# Patient Record
Sex: FEMALE | Race: WHITE | URBAN - METROPOLITAN AREA
[De-identification: names, ages, dates, MRNs, and addresses within clinical notes are randomized per-mention and may not be internally consistent; named-entity substitution may affect disease eponyms.]

---
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
## 2024-11-05 ENCOUNTER — APPOINTMENT (EMERGENCY)
Dept: RADIOLOGY | Facility: HOSPITAL | Age: 19
End: 2024-11-05
Payer: COMMERCIAL

## 2024-11-05 ENCOUNTER — HOSPITAL ENCOUNTER (EMERGENCY)
Facility: HOSPITAL | Age: 19
Discharge: HOME | End: 2024-11-05
Attending: EMERGENCY MEDICINE | Admitting: EMERGENCY MEDICINE
Payer: COMMERCIAL

## 2024-11-05 VITALS
SYSTOLIC BLOOD PRESSURE: 117 MMHG | BODY MASS INDEX: 21.98 KG/M2 | HEART RATE: 100 BPM | WEIGHT: 145 LBS | DIASTOLIC BLOOD PRESSURE: 70 MMHG | OXYGEN SATURATION: 99 % | TEMPERATURE: 99.5 F | RESPIRATION RATE: 17 BRPM | HEIGHT: 68 IN

## 2024-11-05 DIAGNOSIS — N12 PYELONEPHRITIS: Primary | ICD-10-CM

## 2024-11-05 LAB
ALBUMIN SERPL-MCNC: 4.1 G/DL (ref 3.5–5.7)
ALP SERPL-CCNC: 44 IU/L (ref 34–125)
ALT SERPL-CCNC: 11 IU/L (ref 7–52)
ANION GAP SERPL CALC-SCNC: 10 MEQ/L (ref 3–15)
AST SERPL-CCNC: 13 IU/L (ref 13–39)
BACTERIA URNS QL MICRO: 1 /HPF
BASOPHILS # BLD: 0.08 K/UL (ref 0.01–0.1)
BASOPHILS NFR BLD: 0.6 %
BILIRUB SERPL-MCNC: 0.5 MG/DL (ref 0.3–1.2)
BILIRUB UR QL STRIP.AUTO: NEGATIVE MG/DL
BUN SERPL-MCNC: 9 MG/DL (ref 7–25)
CALCIUM SERPL-MCNC: 9 MG/DL (ref 8.6–10.3)
CHLORIDE SERPL-SCNC: 101 MEQ/L (ref 98–107)
CLARITY UR REFRACT.AUTO: CLEAR
CO2 SERPL-SCNC: 23 MEQ/L (ref 21–31)
COLOR UR AUTO: YELLOW
CREAT SERPL-MCNC: 1 MG/DL (ref 0.6–1.2)
DIFFERENTIAL METHOD BLD: ABNORMAL
EGFRCR SERPLBLD CKD-EPI 2021: >60 ML/MIN/1.73M*2
EOSINOPHIL # BLD: 0.01 K/UL (ref 0.04–0.36)
EOSINOPHIL NFR BLD: 0.1 %
ERYTHROCYTE [DISTWIDTH] IN BLOOD BY AUTOMATED COUNT: 12 % (ref 11.7–14.4)
FLUAV RNA SPEC QL NAA+PROBE: NEGATIVE
FLUBV RNA SPEC QL NAA+PROBE: NEGATIVE
GLUCOSE SERPL-MCNC: 106 MG/DL (ref 70–99)
GLUCOSE UR STRIP.AUTO-MCNC: NEGATIVE MG/DL
HCG UR QL: NEGATIVE
HCT VFR BLD AUTO: 38 % (ref 35–45)
HGB BLD-MCNC: 12.9 G/DL (ref 11.8–15.7)
HGB UR QL STRIP.AUTO: NEGATIVE
HYALINE CASTS #/AREA URNS LPF: ABNORMAL /LPF
IMM GRANULOCYTES # BLD AUTO: 0.05 K/UL (ref 0–0.08)
IMM GRANULOCYTES NFR BLD AUTO: 0.4 %
KETONES UR STRIP.AUTO-MCNC: ABNORMAL MG/DL
LEUKOCYTE ESTERASE UR QL STRIP.AUTO: ABNORMAL
LYMPHOCYTES # BLD: 0.67 K/UL (ref 1.2–3.5)
LYMPHOCYTES NFR BLD: 5.4 %
MCH RBC QN AUTO: 29.5 PG (ref 28–33.2)
MCHC RBC AUTO-ENTMCNC: 33.9 G/DL (ref 32.2–35.5)
MCV RBC AUTO: 86.8 FL (ref 83–98)
MONOCYTES # BLD: 1.21 K/UL (ref 0.28–0.8)
MONOCYTES NFR BLD: 9.8 %
MUCOUS THREADS URNS QL MICRO: 3 /LPF
NEUTROPHILS # BLD: 10.31 K/UL (ref 1.7–7)
NEUTS SEG NFR BLD: 83.7 %
NITRITE UR QL STRIP.AUTO: NEGATIVE
NRBC BLD-RTO: 0 %
PH UR STRIP.AUTO: 6 [PH]
PLATELET # BLD AUTO: 240 K/UL (ref 150–369)
PMV BLD AUTO: 11.4 FL (ref 9.4–12.3)
POTASSIUM SERPL-SCNC: 3.4 MEQ/L (ref 3.5–5.1)
PROT SERPL-MCNC: 7.1 G/DL (ref 6–8.2)
PROT UR QL STRIP.AUTO: 1
RBC # BLD AUTO: 4.38 M/UL (ref 3.93–5.22)
RBC #/AREA URNS HPF: ABNORMAL /HPF
RSV RNA SPEC QL NAA+PROBE: NEGATIVE
SARS-COV-2 RNA RESP QL NAA+PROBE: NEGATIVE
SODIUM SERPL-SCNC: 134 MEQ/L (ref 136–145)
SP GR UR REFRACT.AUTO: 1.02
SQUAMOUS URNS QL MICRO: ABNORMAL /HPF
UROBILINOGEN UR STRIP-ACNC: 0.2 EU/DL
WBC # BLD AUTO: 12.33 K/UL (ref 3.8–10.5)
WBC #/AREA URNS HPF: ABNORMAL /HPF

## 2024-11-05 PROCEDURE — 63600000 HC DRUGS/DETAIL CODE: Mod: JZ | Performed by: PHYSICIAN ASSISTANT

## 2024-11-05 PROCEDURE — 85025 COMPLETE CBC W/AUTO DIFF WBC: CPT

## 2024-11-05 PROCEDURE — 87086 URINE CULTURE/COLONY COUNT: CPT | Performed by: EMERGENCY MEDICINE

## 2024-11-05 PROCEDURE — 25800000 HC PHARMACY IV SOLUTIONS: Performed by: PHYSICIAN ASSISTANT

## 2024-11-05 PROCEDURE — 3E033GC INTRODUCTION OF OTHER THERAPEUTIC SUBSTANCE INTO PERIPHERAL VEIN, PERCUTANEOUS APPROACH: ICD-10-PCS | Performed by: EMERGENCY MEDICINE

## 2024-11-05 PROCEDURE — 81003 URINALYSIS AUTO W/O SCOPE: CPT

## 2024-11-05 PROCEDURE — 87637 SARSCOV2&INF A&B&RSV AMP PRB: CPT | Performed by: PHYSICIAN ASSISTANT

## 2024-11-05 PROCEDURE — 63700000 HC SELF-ADMINISTRABLE DRUG: Performed by: PHYSICIAN ASSISTANT

## 2024-11-05 PROCEDURE — 36415 COLL VENOUS BLD VENIPUNCTURE: CPT

## 2024-11-05 PROCEDURE — 84703 CHORIONIC GONADOTROPIN ASSAY: CPT | Performed by: EMERGENCY MEDICINE

## 2024-11-05 PROCEDURE — 80053 COMPREHEN METABOLIC PANEL: CPT | Performed by: EMERGENCY MEDICINE

## 2024-11-05 PROCEDURE — 81001 URINALYSIS AUTO W/SCOPE: CPT | Performed by: EMERGENCY MEDICINE

## 2024-11-05 PROCEDURE — 85025 COMPLETE CBC W/AUTO DIFF WBC: CPT | Performed by: EMERGENCY MEDICINE

## 2024-11-05 PROCEDURE — 71046 X-RAY EXAM CHEST 2 VIEWS: CPT

## 2024-11-05 PROCEDURE — 99284 EMERGENCY DEPT VISIT MOD MDM: CPT | Mod: 25

## 2024-11-05 PROCEDURE — 82040 ASSAY OF SERUM ALBUMIN: CPT

## 2024-11-05 PROCEDURE — 96375 TX/PRO/DX INJ NEW DRUG ADDON: CPT

## 2024-11-05 PROCEDURE — 3E03329 INTRODUCTION OF OTHER ANTI-INFECTIVE INTO PERIPHERAL VEIN, PERCUTANEOUS APPROACH: ICD-10-PCS | Performed by: EMERGENCY MEDICINE

## 2024-11-05 PROCEDURE — 96365 THER/PROPH/DIAG IV INF INIT: CPT

## 2024-11-05 RX ORDER — EPINEPHRINE 0.3 MG/.3ML
0.3 INJECTION SUBCUTANEOUS
COMMUNITY

## 2024-11-05 RX ORDER — ONDANSETRON 4 MG/1
4 TABLET, ORALLY DISINTEGRATING ORAL EVERY 8 HOURS PRN
Qty: 10 TABLET | Refills: 0 | Status: SHIPPED | OUTPATIENT
Start: 2024-11-05 | End: 2024-11-12

## 2024-11-05 RX ORDER — FLUDROCORTISONE ACETATE 0.1 MG/1
0.1 TABLET ORAL
COMMUNITY
Start: 2024-05-30 | End: 2025-05-25

## 2024-11-05 RX ORDER — DROSPIRENONE AND ETHINYL ESTRADIOL 0.02-3(28)
1 KIT ORAL DAILY
COMMUNITY

## 2024-11-05 RX ORDER — IBUPROFEN 600 MG/1
600 TABLET ORAL ONCE
Status: COMPLETED | OUTPATIENT
Start: 2024-11-05 | End: 2024-11-05

## 2024-11-05 RX ORDER — CEFUROXIME AXETIL 500 MG/1
500 TABLET ORAL 2 TIMES DAILY
Qty: 20 TABLET | Refills: 0 | Status: SHIPPED | OUTPATIENT
Start: 2024-11-05 | End: 2024-11-15

## 2024-11-05 RX ORDER — ONDANSETRON HYDROCHLORIDE 2 MG/ML
4 INJECTION, SOLUTION INTRAVENOUS ONCE
Status: COMPLETED | OUTPATIENT
Start: 2024-11-05 | End: 2024-11-05

## 2024-11-05 RX ADMIN — IBUPROFEN 600 MG: 600 TABLET, FILM COATED ORAL at 20:12

## 2024-11-05 RX ADMIN — ONDANSETRON 4 MG: 2 INJECTION INTRAMUSCULAR; INTRAVENOUS at 20:11

## 2024-11-05 RX ADMIN — CEFTRIAXONE SODIUM 1 G: 1 INJECTION, POWDER, FOR SOLUTION INTRAMUSCULAR; INTRAVENOUS at 19:58

## 2024-11-05 ASSESSMENT — ENCOUNTER SYMPTOMS
DYSURIA: 1
SHORTNESS OF BREATH: 0
FLANK PAIN: 1
SORE THROAT: 0
SEIZURES: 0
FEVER: 1
COUGH: 0
COLOR CHANGE: 0
PALPITATIONS: 0
ARTHRALGIAS: 0
AGITATION: 0
CHILLS: 0
EYE PAIN: 0
BACK PAIN: 0
HEMATURIA: 0
CONFUSION: 0
ABDOMINAL PAIN: 0
NAUSEA: 1
VOMITING: 0

## 2024-11-05 NOTE — Clinical Note
Angie Victoria was seen and treated in our emergency department on 11/5/2024.  Angie Victoria may return to school on 11/09/2024.  May return sooner if feeling better.    If you have any questions or concerns, please don't hesitate to call.      Izabella Capps PA C

## 2024-11-05 NOTE — ED PROVIDER NOTES
Emergency Medicine Note  HPI   HISTORY OF PRESENT ILLNESS     The patient is a 19-year-old female with past medical history including POTS syndrome, pyelonephritis, pneumonia who presents today for evaluation of fever since last night.  Patient states she has had temperatures up to 104 °F today.  She states she has been taking Excedrin and her fever finally improved just prior to coming here.  She reports associated sweats and chills.  Patient states she has also had mid back pain and dysuria.  She states this feels similar to prior urinary tract infection.  She reports that she gets urinary tract infections frequently.  Patient also reports she has had a cough which is nonproductive as well as a sore throat since yesterday.  She denies any specific exposures to infectious diseases.  Patient denies pelvic pain or vaginal discharge.  She denies any concern for STD.  She states she is with 1 partner and they have both been tested negative for STIs in the past.      History provided by:  Patient   used: No    Fever  Associated symptoms: dysuria and nausea    Associated symptoms: no chest pain, no chills, no confusion, no cough, no ear pain, no rash, no sore throat and no vomiting    Flank Pain  Associated symptoms: fever and nausea    Associated symptoms: no abdominal pain, no chest pain, no cough, no ear pain, no rash, no shortness of breath, no sore throat and no vomiting          Patient History   PAST HISTORY     Reviewed from Nursing Triage:  Tobacco  Allergies  Meds  Problems  Med Hx  Surg Hx  Fam Hx  Soc   Hx      Past Medical History:   Diagnosis Date    Kidney infection     Pneumonia     Pott's disease        Past Surgical History   Procedure Laterality Date    Hernia repair         No family history on file.    Social History     Tobacco Use    Smoking status: Never    Smokeless tobacco: Never   Substance Use Topics    Alcohol use: Never    Drug use: Never         Review of Systems    REVIEW OF SYSTEMS     Review of Systems   Constitutional:  Positive for fever. Negative for chills.   HENT:  Negative for ear pain and sore throat.    Eyes:  Negative for pain and visual disturbance.   Respiratory:  Negative for cough and shortness of breath.    Cardiovascular:  Negative for chest pain and palpitations.   Gastrointestinal:  Positive for nausea. Negative for abdominal pain and vomiting.   Endocrine: Negative for polyuria.   Genitourinary:  Positive for dysuria and flank pain. Negative for hematuria.   Musculoskeletal:  Negative for arthralgias and back pain.   Skin:  Negative for color change and rash.   Neurological:  Negative for seizures and syncope.   Psychiatric/Behavioral:  Negative for agitation and confusion.          VITALS     ED Vitals      Date/Time Temp Pulse Resp BP SpO2 [facility]   11/05/24 2000 37.5 °C (99.5 °F) 100 17 117/70 99 % BL   11/05/24 1911 -- 91 16 96/62 100 % AMT   11/05/24 1743 36.4 °C (97.5 °F) 112 18 126/62 98 % KMG          Pulse Ox %: 98 % (11/05/24 1826)  Pulse Ox Interpretation: Normal (11/05/24 1826)           Physical Exam   PHYSICAL EXAM     Physical Exam  Vitals and nursing note reviewed.   Constitutional:       General: She is not in acute distress.     Appearance: She is well-developed. She is not diaphoretic.   HENT:      Head: Normocephalic and atraumatic.      Nose: Nose normal.   Eyes:      Conjunctiva/sclera: Conjunctivae normal.   Neck:      Trachea: Trachea normal.   Cardiovascular:      Rate and Rhythm: Normal rate and regular rhythm.      Heart sounds: Normal heart sounds, S1 normal and S2 normal. No murmur heard.  Pulmonary:      Effort: Pulmonary effort is normal. No respiratory distress.      Breath sounds: Normal breath sounds. No wheezing or rales.   Chest:      Chest wall: No tenderness.   Abdominal:      General: Bowel sounds are normal. There is no distension.      Palpations: Abdomen is soft. There is no mass.      Tenderness: There is no  abdominal tenderness. There is right CVA tenderness and left CVA tenderness. There is no guarding or rebound.   Musculoskeletal:      Cervical back: Neck supple.   Skin:     General: Skin is warm and dry.      Coloration: Skin is not pale.      Findings: No rash.   Neurological:      Mental Status: She is alert and oriented to person, place, and time.      Sensory: No sensory deficit.   Psychiatric:         Speech: Speech normal.         Behavior: Behavior normal. Behavior is cooperative.         Thought Content: Thought content normal.         Judgment: Judgment normal.           PROCEDURES     Procedures     DATA     Results       Procedure Component Value Units Date/Time    SARS-COV-2 (COVID-19)/ FLU A/B, AND RSV, PCR Nasopharynx [[id number]]  (Normal) Collected: 11/05/24 1910    Specimen: Nasopharyngeal Swab from Nasopharynx Updated: 11/05/24 2024     SARS-CoV-2 (COVID-19) Negative     Influenza A Negative     Influenza B Negative     Respiratory Syncytial Virus Negative    Narrative:      Testing performed using real-time PCR for detection of COVID-19. EUA approved validation studies performed on site.     Urinalysis with Reflex Culture [[id number]]  (Abnormal) Collected: 11/05/24 1759    Specimen: Urine, Clean Catch Updated: 11/05/24 1843    Narrative:      The following orders were created for panel order Urinalysis with Reflex Culture.  Procedure                               Abnormality         Status                     ---------                               -----------         ------                     UA Reflex to Culture (Ma...[[id number]]  Abnormal            Final result               UA Microscopic[[id number]]               Abnormal            Final result                 Please view results for these tests on the individual orders.    UA Microscopic [[id number]]  (Abnormal) Collected: 11/05/24 1759    Specimen: Urine, Clean Catch Updated: 11/05/24 1843     RBC, Urine 0 TO 4 /HPF      WBC, Urine 10 TO  20 /HPF      Squamous Epithelial Rare /hpf      Hyaline Cast 3 TO 9 /lpf      Bacteria, Urine +1 /HPF      Mucus +3 /LPF     BhCG, Serum, Qual (if menstruating female and no urine) [[id number]]  (Normal) Collected: 11/05/24 1749    Specimen: Blood, Venous Updated: 11/05/24 1825     Preg Test, Serum Negative    Comprehensive metabolic panel [[id number]]  (Abnormal) Collected: 11/05/24 1749    Specimen: Blood, Venous Updated: 11/05/24 1823     Sodium 134 mEQ/L      Potassium 3.4 mEQ/L      Comment: Results obtained on plasma. Plasma Potassium values may be up to 0.4 mEQ/L less than serum values. The differences may be greater for patients with high platelet or white cell counts.        Chloride 101 mEQ/L      CO2 23 mEQ/L      BUN 9 mg/dL      Creatinine 1.0 mg/dL      Glucose 106 mg/dL      Calcium 9.0 mg/dL      AST (SGOT) 13 IU/L      ALT (SGPT) 11 IU/L      Alkaline Phosphatase 44 IU/L      Total Protein 7.1 g/dL      Comment: Test performed on plasma which typically contains approximately 0.4 g/dL more protein than serum.        Albumin 4.1 g/dL      Bilirubin, Total 0.5 mg/dL      eGFR >60.0 mL/min/1.73m*2      Comment: Calculation based on the Chronic Kidney Disease Epidemiology Collaboration (CKD-EPI) equation refit without adjustment for race.        Anion Gap 10 mEQ/L     UA Reflex to Culture (Macroscopic) [[id number]]  (Abnormal) Collected: 11/05/24 1759    Specimen: Urine, Clean Catch Updated: 11/05/24 1819     Color, Urine Yellow     Clarity, Urine Clear     Specific Gravity, Urine 1.025     pH, Urine 6.0     Leukocyte Esterase Trace     Nitrite, Urine Negative     Protein, Urine +1     Glucose, Urine Negative mg/dL      Ketones, Urine Trace mg/dL      Comment: Free sulfhydryl drugs such as Mesna, Capoten, and Acetylcysteine (Mucomyst) may cause false positive ketonuria.        Urobilinogen, Urine 0.2 EU/dL      Bilirubin, Urine Negative mg/dL      Blood, Urine Negative     Comment: The sensitivity of the  occult blood test is equivalent to approximately 4 intact RBC/HPF.       CBC and differential [[id number]]  (Abnormal) Collected: 11/05/24 1749    Specimen: Blood, Venous Updated: 11/05/24 1818     WBC 12.33 K/uL      RBC 4.38 M/uL      Hemoglobin 12.9 g/dL      Hematocrit 38.0 %      MCV 86.8 fL      MCH 29.5 pg      MCHC 33.9 g/dL      RDW 12.0 %      Platelets 240 K/uL      MPV 11.4 fL      Differential Type Auto     nRBC 0.0 %      Immature Granulocytes 0.4 %      Neutrophils 83.7 %      Lymphocytes 5.4 %      Monocytes 9.8 %      Eosinophils 0.1 %      Basophils 0.6 %      Immature Granulocytes, Absolute 0.05 K/uL      Neutrophils, Absolute 10.31 K/uL      Lymphocytes, Absolute 0.67 K/uL      Monocytes, Absolute 1.21 K/uL      Eosinophils, Absolute 0.01 K/uL      Basophils, Absolute 0.08 K/uL             Imaging Results              X-RAY CHEST 2 VIEWS (Final result)  Result time 11/05/24 19:37:13      Final result                   Impression:    IMPRESSION:  No acute abnormality.               Narrative:    CLINICAL HISTORY:   fever    COMMENT:    Technique:  2 x-ray views of the chest.    Comparison date: none.    The lungs are clear without discrete infiltrate.  There are no pleural  effusions.  The heart and mediastinal contours are within normal limits.  The  osseous structures are intact.                                      No orders to display       Scoring tools                                  ED Course & MDM   MDM / ED COURSE / CLINICAL IMPRESSION / DISPO     Medical Decision Making  20 yo F presented for evaluation of fever x 1 day. Associated dysuria, flank pain, and cough. CXR negative for pneumonia. UA with mild pyuria and 1+ bacteria. History of pyelonephritis in the past. No vomiting. IV rocephin initiated. Patient is nontoxic appearing. Fever improved with acetaminophen prior to arrival. Discussed trial of out patient treatment with patient which she is agreeable to. I have low suspicion for  ureteral obstruction/stone. I have low suspicion for PID. Will treat with ceftin x 10 days. Strict return precautions were given.     Problems Addressed:  Pyelonephritis: acute illness or injury    Amount and/or Complexity of Data Reviewed  External Data Reviewed: labs.  Labs: ordered. Decision-making details documented in ED Course.  Radiology: ordered. Decision-making details documented in ED Course.    Risk  Prescription drug management.  Decision regarding hospitalization.        ED Course as of 11/05/24 2107   Tue Nov 05, 2024 1826 WBC(!): 12.33 [SL]   1826 Ketones, Urine(!): Trace [SL]   1826 Protein, Urine(!): +1 [SL]   1826 Leukocyte Esterase(!): Trace [SL]   1826 Creatinine: 1.0 [SL]   1826 Preg Test, Serum: Negative [SL]   1851 WBC, Urine(!): 10 TO 20 [SL]   1851 Bacteria, Urine(!): +1 [SL]   1948 X-RAY CHEST 2 VIEWS  IMPRESSION:  No acute abnormality.   [SL]      ED Course User Index  [SL] Izabella Capps PA C     Clinical Impression      Pyelonephritis     _________________       ED Disposition   Discharge                       Izabella Capps PA C  11/05/24 2107

## 2024-11-06 LAB — BACTERIA UR CULT: NORMAL

## 2024-11-06 NOTE — ED ATTESTATION NOTE
I have personally seen and examined the patient.  I personally performed the key components of the encounter and provided medical decision making for this patient. I reviewed and agree with the PA/NP/Resident's assessment and plan of care, with any exceptions as documented below.    My focused history, examination, assessment, and plan of care of Angie Victoria is as follows:    HPI: The patient is a 19 y.o. who comes to the ED for right flank pain, urinary symptoms. Onset yesterday.  Associated fever.  No abdominal pain.  Also endorses some mild URI symptoms.  No vaginal symptoms.    I was provided additional history from: Self  Independent source or record: Prior records    Pertinent past medical history includes: POTS, pyelonephritis      Exam: Vital signs reviewed- wnl with exception of mild tachycardia, resolved here in the ED. The oxygen saturation is SpO2: 98 %  which is normal      Awake, alert, pleasant, no distress, non-toxic appearing.  Facial movements symmetric, speech clear and easy. PERRL, EOMI.   Not tachypneic, no resp distress.   Abd nondistended  Moves all extremities easily.         Impression/Plan/Medical decision making/ED course: Pt here with acute urinary symptoms, right flank pain.  UA with 10-20 white blood cells.  Patient nontoxic-appearing.  Rocephin ordered.  Will discharge with oral antibiotics.  Patient comfortable with this plan             Disposition: Discharge        Procedures              NOTE: Patient seen during a time of significantly increased volumes, increased boarding in the ED, decreased capacity and staff which may have contributed to lengthened stay in ED. Portion of management and initial evaluation may have been done while in the waiting room because of this. We are also practicing during a time of national shortages of items such as blood cultures and intravenous fluids, which contribute to management issues. This document was created using dragon dictation  software; there might be some typographical errors due to this technology. Extensive detailed charting also is limited secondary to high ED volumes and may not reflect all conversations and decisions made between patient and provider.        Margo Bey MD  11/05/24 8976

## 2024-11-06 NOTE — DISCHARGE INSTRUCTIONS
Take antibiotics as prescribed starting tomorrow morning.    You may use ondansetron as needed for nausea.     Please take acetaminophen (Tylenol) as per package instructions.  Please do not exceed 3 g in 24 hours    Take ibuprofen 600mg every 6 hours for pain. Take with food. Stop taking for stomach upset.     Stay well hydrated.     Return for worsening pain, vomiting, or any new symptoms.    Return to the ED for worsening of symptoms or any problems or concerns.  It is very important to follow up with your healthcare provider for re-evaluation.    You may have incidental findings on your lab work or radiology studies today. Please be sure to discuss your results with your primary care doctor. They may follow and perform additional testing/imaging if needed.

## 2025-01-04 NOTE — PROGRESS NOTES
Adult Annual Physical  Name: Melissa Munoz      : 2005      MRN: [id number]  Encounter Provider: Nicholas Swan DO  Encounter Date: 2025   Encounter department: [facility] IN PARTNERSHIP WITH [facility]    Assessment & Plan  Annual physical exam         Encounter for vaccination  Will obtain records of previous vaccinations.       Attention deficit hyperactivity disorder (ADHD), unspecified ADHD type  Patient was previously diagnosed with ADHD by past provider but was never started on any medication.  Her ADHD was manageable in high school but in college it has gotten worse and she has a lot of noted difficulty with test taking and losing her focus.  I discussed options for ADHD including Adderall, Wellbutrin.  She would like to try something that is not an antidepressant.  Urine drug screen was obtained today in office and drug contract was signed.  I will start her on Adderall XR 10 mg.  I recommended a follow-up in 1 to 2 months to see how she is doing on the medication.  She was counseled on side effects to monitor for.  Orders:    amphetamine-dextroamphetamine (ADDERALL XR, 10MG,) 10 MG 24 hr capsule; Take 1 capsule (10 mg total) by mouth every morning Max Daily Amount: 10 mg    Long term use of drug    Orders:    Drug Monitoring, Panel 1, with Confirmation, Urine    POTS (postural orthostatic tachycardia syndrome)  Per history.  Patient is on fludrocortisone and follows with cardiology through [facility].       Iron deficiency anemia, unspecified iron deficiency anemia type         Chronic abdominal pain  Patient follows with gastroenterology specialist through [facility].  She is currently on a liquid diet and will be having a endoscopy and colonoscopy tomorrow.       Immunizations and preventive care screenings were discussed with patient today. Appropriate education was printed on patient's after visit summary.    Counseling:  Dental Health: discussed  importance of regular tooth brushing, flossing, and dental visits.  Exercise: the importance of regular exercise/physical activity was discussed. Recommend exercise 3-5 times per week for at least 30 minutes.       Depression Screening and Follow-up Plan: Patient was screened for depression during today's encounter. They screened negative with a PHQ-2 score of 0.        Patient was seen today for an annual physical exam. Age appropriate screening tests were done as above. Annual labs were ordered to be done through [facility]. Patient will be contacted regarding results and follow up will be based on findings. Patient was counseled on the importance of proper diet and exercise. I recommend diets rich in lean meats and leafy green vegetables. Try to have 150 minutes of exercise weekly at moderate intensity. See problem based charting for any chronic problems or new findings and current workup/management.    40 minutes were spent on chart review, examination, and plan of care. This note was dictated using software.     History of Present Illness     Adult Annual Physical:  Patient presents for annual physical.     Diet and Physical Activity:  - Diet/Nutrition: well balanced diet.  - Exercise: moderate cardiovascular exercise and walking.    Depression Screening:  - PHQ-2 Score: 0    General Health:  - Sleep: sleeps well.  - Hearing: normal hearing bilateral ears.  - Vision: no vision problems.  - Dental: regular dental visits.    Review of Systems   Constitutional:  Negative for fever.   Respiratory:  Negative for shortness of breath.    Cardiovascular:  Negative for chest pain.   Gastrointestinal:  Positive for abdominal pain. Negative for constipation and diarrhea.   Skin:  Negative for rash.   Psychiatric/Behavioral:  Positive for decreased concentration. The patient is nervous/anxious.      Patient has a history of anxiety and has had issues with adhd. She was diagnosed by her pediatrician in the past and she has  "a lot of anxiety. She states that in college she has had a lot of issues with studying and retaining information. She will have a day plan and then she forgets to do that after getting distracted and feels that she daydreams a lot.    Objective   /68 (BP Location: Left arm, Patient Position: Sitting, Cuff Size: Standard)   Pulse 86   Temp 98.4 °F (36.9 °C) (Oral)   Ht 5' 8\" (1.727 m)   Wt 73.5 kg (162 lb)   SpO2 99%   BMI 24.63 kg/m²     Physical Exam  Vitals and nursing note reviewed.   Constitutional:       General: She is not in acute distress.     Appearance: Normal appearance. She is well-developed.   HENT:      Head: Normocephalic and atraumatic.      Right Ear: Tympanic membrane, ear canal and external ear normal.      Left Ear: Tympanic membrane, ear canal and external ear normal.      Nose: Nose normal. No congestion.      Mouth/Throat:      Mouth: Mucous membranes are moist.      Pharynx: No oropharyngeal exudate.   Eyes:      Conjunctiva/sclera: Conjunctivae normal.   Cardiovascular:      Rate and Rhythm: Normal rate and regular rhythm.      Heart sounds: Normal heart sounds. No murmur heard.  Pulmonary:      Effort: Pulmonary effort is normal. No respiratory distress.      Breath sounds: Normal breath sounds. No wheezing.   Abdominal:      Palpations: Abdomen is soft.      Tenderness: There is no abdominal tenderness.   Musculoskeletal:         General: Normal range of motion.      Cervical back: Normal range of motion.   Skin:     General: Skin is warm and dry.      Findings: No rash.   Neurological:      General: No focal deficit present.      Mental Status: She is alert and oriented to person, place, and time. Mental status is at baseline.   Psychiatric:         Mood and Affect: Mood normal.         Behavior: Behavior normal.         Thought Content: Thought content normal.         Judgment: Judgment normal.         "

## 2025-01-14 ENCOUNTER — OFFICE VISIT (OUTPATIENT)
Dept: FAMILY MEDICINE CLINIC | Facility: CLINIC | Age: 20
End: 2025-01-14

## 2025-01-14 VITALS
HEART RATE: 86 BPM | OXYGEN SATURATION: 99 % | BODY MASS INDEX: 24.55 KG/M2 | TEMPERATURE: 98.4 F | SYSTOLIC BLOOD PRESSURE: 112 MMHG | HEIGHT: 68 IN | DIASTOLIC BLOOD PRESSURE: 68 MMHG | WEIGHT: 162 LBS

## 2025-01-14 DIAGNOSIS — G90.A POTS (POSTURAL ORTHOSTATIC TACHYCARDIA SYNDROME): ICD-10-CM

## 2025-01-14 DIAGNOSIS — Z79.899 LONG TERM USE OF DRUG: ICD-10-CM

## 2025-01-14 DIAGNOSIS — F90.9 ATTENTION DEFICIT HYPERACTIVITY DISORDER (ADHD), UNSPECIFIED ADHD TYPE: ICD-10-CM

## 2025-01-14 DIAGNOSIS — D50.9 IRON DEFICIENCY ANEMIA, UNSPECIFIED IRON DEFICIENCY ANEMIA TYPE: ICD-10-CM

## 2025-01-14 DIAGNOSIS — Z23 ENCOUNTER FOR VACCINATION: ICD-10-CM

## 2025-01-14 DIAGNOSIS — R10.9 CHRONIC ABDOMINAL PAIN: ICD-10-CM

## 2025-01-14 DIAGNOSIS — G89.29 CHRONIC ABDOMINAL PAIN: ICD-10-CM

## 2025-01-14 DIAGNOSIS — Z00.00 ANNUAL PHYSICAL EXAM: Primary | ICD-10-CM

## 2025-01-14 PROBLEM — Z87.39: Status: ACTIVE | Noted: 2025-01-14

## 2025-01-14 PROBLEM — K21.9 CHRONIC GERD: Status: ACTIVE | Noted: 2025-01-14

## 2025-01-14 PROBLEM — G57.32 NEUROPATHY OF LEFT PERONEAL NERVE: Status: ACTIVE | Noted: 2017-05-24

## 2025-01-14 PROBLEM — Z86.19 HISTORY OF LYME DISEASE: Status: ACTIVE | Noted: 2025-01-14

## 2025-01-14 PROBLEM — J45.990 EXERCISE-INDUCED ASTHMA: Status: ACTIVE | Noted: 2025-01-14

## 2025-01-14 PROBLEM — L63.9 ALOPECIA AREATA: Status: ACTIVE | Noted: 2018-05-21

## 2025-01-14 PROBLEM — I73.00 RAYNAUD'S PHENOMENON WITHOUT GANGRENE: Status: ACTIVE | Noted: 2025-01-14

## 2025-01-14 PROBLEM — J30.9 ALLERGIC RHINITIS: Status: ACTIVE | Noted: 2018-02-12

## 2025-01-14 PROCEDURE — 99385 PREV VISIT NEW AGE 18-39: CPT | Performed by: STUDENT IN AN ORGANIZED HEALTH CARE EDUCATION/TRAINING PROGRAM

## 2025-01-14 PROCEDURE — 99213 OFFICE O/P EST LOW 20 MIN: CPT | Performed by: STUDENT IN AN ORGANIZED HEALTH CARE EDUCATION/TRAINING PROGRAM

## 2025-01-14 RX ORDER — FLUDROCORTISONE ACETATE 0.1 MG/1
0.1 TABLET ORAL 2 TIMES DAILY
COMMUNITY
Start: 2024-05-30 | End: 2025-05-25

## 2025-01-14 RX ORDER — DEXTROAMPHETAMINE SACCHARATE, AMPHETAMINE ASPARTATE MONOHYDRATE, DEXTROAMPHETAMINE SULFATE AND AMPHETAMINE SULFATE 2.5; 2.5; 2.5; 2.5 MG/1; MG/1; MG/1; MG/1
10 CAPSULE, EXTENDED RELEASE ORAL EVERY MORNING
Qty: 30 CAPSULE | Refills: 0 | Status: SHIPPED | OUTPATIENT
Start: 2025-01-14

## 2025-01-14 NOTE — ASSESSMENT & PLAN NOTE
Per history.  Patient is on fludrocortisone and follows with cardiology through [facility].

## 2025-01-14 NOTE — ASSESSMENT & PLAN NOTE
Patient was previously diagnosed with ADHD by past provider but was never started on any medication.  Her ADHD was manageable in high school but in college it has gotten worse and she has a lot of noted difficulty with test taking and losing her focus.  I discussed options for ADHD including Adderall, Wellbutrin.  She would like to try something that is not an antidepressant.  Urine drug screen was obtained today in office and drug contract was signed.  I will start her on Adderall XR 10 mg.  I recommended a follow-up in 1 to 2 months to see how she is doing on the medication.  She was counseled on side effects to monitor for.  Orders:    amphetamine-dextroamphetamine (ADDERALL XR, 10MG,) 10 MG 24 hr capsule; Take 1 capsule (10 mg total) by mouth every morning Max Daily Amount: 10 mg

## 2025-01-14 NOTE — ASSESSMENT & PLAN NOTE
Patient follows with gastroenterology specialist through [facility].  She is currently on a liquid diet and will be having a endoscopy and colonoscopy tomorrow.

## 2025-01-15 ENCOUNTER — TELEPHONE (OUTPATIENT)
Dept: FAMILY MEDICINE CLINIC | Facility: CLINIC | Age: 20
End: 2025-01-15

## 2025-01-15 ENCOUNTER — RESULTS FOLLOW-UP (OUTPATIENT)
Dept: FAMILY MEDICINE CLINIC | Facility: CLINIC | Age: 20
End: 2025-01-15

## 2025-01-15 LAB
AMPHETAMINES UR QL: NEGATIVE NG/ML
BARBITURATES UR QL: NEGATIVE NG/ML
BENZODIAZ UR QL: NEGATIVE NG/ML
BZE UR QL: NEGATIVE NG/ML
CREAT UR-MCNC: 20.7 MG/DL
METHADONE UR QL: NEGATIVE NG/ML
OPIATES UR QL: NEGATIVE NG/ML
OXIDANTS UR QL: NEGATIVE MCG/ML
OXYCODONE UR QL: NEGATIVE NG/ML
PCP UR QL: NEGATIVE NG/ML
PH UR: 6.2 [PH] (ref 4.5–9)
THC UR QL: NEGATIVE NG/ML

## 2025-01-15 NOTE — TELEPHONE ENCOUNTER
Prior Auth for AMPHETAMINE-DEXTROAMPHET ER 10MG CAPSULE IS APPROVED    This request has been approved using information available on the patient's profile. CaseId:[id number];Status:Approved;Review Type:Prior Auth;Coverage Start Date:12/16/2024;Coverage End Date:01/15/2026;     Approval forms scanned into PT chart.     Key Code: [id number]    PT and pharmacy notified.

## 2025-02-08 DIAGNOSIS — F90.9 ATTENTION DEFICIT HYPERACTIVITY DISORDER (ADHD), UNSPECIFIED ADHD TYPE: ICD-10-CM

## 2025-02-10 RX ORDER — DEXTROAMPHETAMINE SACCHARATE, AMPHETAMINE ASPARTATE MONOHYDRATE, DEXTROAMPHETAMINE SULFATE AND AMPHETAMINE SULFATE 2.5; 2.5; 2.5; 2.5 MG/1; MG/1; MG/1; MG/1
10 CAPSULE, EXTENDED RELEASE ORAL EVERY MORNING
Qty: 30 CAPSULE | Refills: 0 | Status: SHIPPED | OUTPATIENT
Start: 2025-02-10 | End: 2025-02-14 | Stop reason: SDUPTHER

## 2025-02-14 DIAGNOSIS — F90.9 ATTENTION DEFICIT HYPERACTIVITY DISORDER (ADHD), UNSPECIFIED ADHD TYPE: ICD-10-CM

## 2025-02-14 RX ORDER — DEXTROAMPHETAMINE SACCHARATE, AMPHETAMINE ASPARTATE MONOHYDRATE, DEXTROAMPHETAMINE SULFATE AND AMPHETAMINE SULFATE 2.5; 2.5; 2.5; 2.5 MG/1; MG/1; MG/1; MG/1
10 CAPSULE, EXTENDED RELEASE ORAL EVERY MORNING
Qty: 30 CAPSULE | Refills: 0 | Status: SHIPPED | OUTPATIENT
Start: 2025-02-14

## 2025-02-14 NOTE — TELEPHONE ENCOUNTER
[facility] does not have patients Adderall. Can you please try the [facility] on [address] [place].

## 2025-03-03 NOTE — ASSESSMENT & PLAN NOTE
Orders:    amphetamine-dextroamphetamine (ADDERALL XR) 20 MG 24 hr capsule; Take 1 capsule (20 mg total) by mouth every morning Max Daily Amount: 20 mg

## 2025-03-03 NOTE — PROGRESS NOTES
"Virtual Regular VisitName: Melissa Munoz      : 2005      MRN: [id number]  Encounter Provider: Nicholas Swan DO  Encounter Date: 3/13/2025   Encounter department: [facility]  :  Assessment & Plan  Attention deficit hyperactivity disorder (ADHD), unspecified ADHD type    Orders:    amphetamine-dextroamphetamine (ADDERALL XR) 20 MG 24 hr capsule; Take 1 capsule (20 mg total) by mouth every morning Max Daily Amount: 20 mg          Patient is a 19-year-old female presenting today for follow-up on ADHD.  She has been doing well on the Adderall.  I will make a slight adjustment to her regimen by increasing to 20 mg daily.  Her main concern is that is wearing off when she goes to do homework.  I will send this to [facility] near her [facility].    History of Present Illness     HPI  Review of Systems   Constitutional:  Negative for fever.   Respiratory:  Negative for shortness of breath.    Cardiovascular:  Negative for chest pain.   Gastrointestinal:  Negative for abdominal pain, constipation and diarrhea.   Skin:  Negative for rash.   Psychiatric/Behavioral:  Negative for decreased concentration.      Is a 19-year-old female presenting today for follow-up on ADHD.  She was started on Adderall XR 10 mg as needed daily.  She notices significant improvement in her concentration during the day.  Her main concern is that it is wearing off before she finishes her schoolwork at nighttime.  She would like to try a change on her medication to see if it works more effectively later on in the day.    Objective   Ht 5' 8\" (1.727 m)   Wt 73.5 kg (162 lb)   BMI 24.63 kg/m²     Physical Exam  Vitals and nursing note reviewed.   Constitutional:       General: She is not in acute distress.     Appearance: Normal appearance. She is well-developed.   HENT:      Head: Normocephalic and atraumatic.      Right Ear: External ear normal.      Left Ear: External ear " normal.      Nose: Nose normal.   Eyes:      Conjunctiva/sclera: Conjunctivae normal.   Pulmonary:      Effort: Pulmonary effort is normal. No respiratory distress.   Skin:     General: Skin is warm and dry.      Findings: No rash.   Neurological:      General: No focal deficit present.      Mental Status: She is alert and oriented to person, place, and time. Mental status is at baseline.   Psychiatric:         Mood and Affect: Mood normal.         Behavior: Behavior normal.         Thought Content: Thought content normal.         Judgment: Judgment normal.         Administrative Statements   Encounter provider Nicholas Swan,     The Patient is located at Other and in the following state in which I hold an active license [place].    The patient was identified by name and date of birth. Melissa Munoz was informed that this is a telemedicine visit and that the visit is being conducted through the Epic Embedded platform. She agrees to proceed..  My office door was closed. No one else was in the room.  She acknowledged consent and understanding of privacy and security of the video platform. The patient has agreed to participate and understands they can discontinue the visit at any time.    I have spent a total time of 15 minutes in caring for this patient on the day of the visit/encounter including Instructions for management, Documenting in the medical record, and Obtaining or reviewing history  .

## 2025-03-07 RX ORDER — CEPHALEXIN 500 MG/1
1 CAPSULE ORAL 2 TIMES DAILY
COMMUNITY
Start: 2025-01-08 | End: 2025-03-13 | Stop reason: ALTCHOICE

## 2025-03-07 RX ORDER — NITROFURANTOIN 25; 75 MG/1; MG/1
CAPSULE ORAL
COMMUNITY
Start: 2025-02-12 | End: 2025-03-13 | Stop reason: ALTCHOICE

## 2025-03-07 RX ORDER — DROSPIRENONE AND ETHINYL ESTRADIOL 0.03MG-3MG
KIT ORAL EVERY 24 HOURS
COMMUNITY

## 2025-03-07 RX ORDER — CLOBETASOL PROPIONATE 0.5 MG/G
CREAM TOPICAL
COMMUNITY

## 2025-03-07 RX ORDER — ALBUTEROL SULFATE 90 UG/1
INHALANT RESPIRATORY (INHALATION)
COMMUNITY

## 2025-03-07 RX ORDER — SULFASALAZINE 500 MG/1
TABLET, DELAYED RELEASE ORAL
COMMUNITY

## 2025-03-07 RX ORDER — DROSPIRENONE AND ETHINYL ESTRADIOL 0.02-3(28)
1 KIT ORAL DAILY
COMMUNITY

## 2025-03-07 RX ORDER — EPINEPHRINE 0.3 MG/.3ML
0.3 INJECTION SUBCUTANEOUS
COMMUNITY

## 2025-03-07 RX ORDER — ONDANSETRON 4 MG/1
4 TABLET, ORALLY DISINTEGRATING ORAL EVERY 8 HOURS PRN
COMMUNITY
Start: 2024-11-05

## 2025-03-07 RX ORDER — ADAPALENE GEL USP, 0.3% 3 MG/G
GEL TOPICAL
COMMUNITY

## 2025-03-07 RX ORDER — MULTIVIT-MIN/IRON FUM/FOLIC AC 7.5 MG-4
TABLET ORAL DAILY
COMMUNITY

## 2025-03-07 RX ORDER — HYDROXYZINE HYDROCHLORIDE 25 MG/1
TABLET, FILM COATED ORAL
COMMUNITY

## 2025-03-07 RX ORDER — CETIRIZINE HYDROCHLORIDE 10 MG/1
10 TABLET ORAL
COMMUNITY

## 2025-03-07 RX ORDER — POLYETHYLENE GLYCOL-3350 AND ELECTROLYTES 236; 6.74; 5.86; 2.97; 22.74 G/274.31G; G/274.31G; G/274.31G; G/274.31G; G/274.31G
POWDER, FOR SOLUTION ORAL
COMMUNITY
Start: 2025-01-13 | End: 2025-03-13 | Stop reason: ALTCHOICE

## 2025-03-07 RX ORDER — POLYETHYLENE GLYCOL 3350, SODIUM CHLORIDE, SODIUM BICARBONATE, POTASSIUM CHLORIDE 420; 11.2; 5.72; 1.48 G/4L; G/4L; G/4L; G/4L
240 POWDER, FOR SOLUTION ORAL
COMMUNITY
Start: 2025-01-02

## 2025-03-07 RX ORDER — ACETAMINOPHEN 325 MG/1
TABLET ORAL
COMMUNITY
End: 2025-03-13 | Stop reason: ALTCHOICE

## 2025-03-07 RX ORDER — FAMOTIDINE 20 MG/1
TABLET, FILM COATED ORAL EVERY 24 HOURS
COMMUNITY

## 2025-03-13 ENCOUNTER — TELEMEDICINE (OUTPATIENT)
Age: 20
End: 2025-03-13

## 2025-03-13 VITALS — WEIGHT: 162 LBS | HEIGHT: 68 IN | BODY MASS INDEX: 24.55 KG/M2

## 2025-03-13 DIAGNOSIS — F90.9 ATTENTION DEFICIT HYPERACTIVITY DISORDER (ADHD), UNSPECIFIED ADHD TYPE: Primary | ICD-10-CM

## 2025-03-13 PROCEDURE — 99213 OFFICE O/P EST LOW 20 MIN: CPT | Performed by: STUDENT IN AN ORGANIZED HEALTH CARE EDUCATION/TRAINING PROGRAM

## 2025-03-13 RX ORDER — DEXTROAMPHETAMINE SACCHARATE, AMPHETAMINE ASPARTATE MONOHYDRATE, DEXTROAMPHETAMINE SULFATE AND AMPHETAMINE SULFATE 5; 5; 5; 5 MG/1; MG/1; MG/1; MG/1
20 CAPSULE, EXTENDED RELEASE ORAL EVERY MORNING
Qty: 30 CAPSULE | Refills: 0 | Status: SHIPPED | OUTPATIENT
Start: 2025-03-13

## 2025-04-04 ENCOUNTER — HOSPITAL ENCOUNTER (OUTPATIENT)
Facility: CLINIC | Age: 20
Discharge: HOME | End: 2025-04-04
Attending: FAMILY MEDICINE
Payer: COMMERCIAL

## 2025-04-04 VITALS
DIASTOLIC BLOOD PRESSURE: 74 MMHG | SYSTOLIC BLOOD PRESSURE: 106 MMHG | HEART RATE: 93 BPM | OXYGEN SATURATION: 96 % | TEMPERATURE: 98 F

## 2025-04-04 DIAGNOSIS — L08.9 PIERCED NAVEL INFECTION: Primary | ICD-10-CM

## 2025-04-04 DIAGNOSIS — S31.135A PIERCED NAVEL INFECTION: Primary | ICD-10-CM

## 2025-04-04 PROCEDURE — S9083 URGENT CARE CENTER GLOBAL: HCPCS

## 2025-04-04 PROCEDURE — 99202 OFFICE O/P NEW SF 15 MIN: CPT

## 2025-04-04 RX ORDER — SULFAMETHOXAZOLE AND TRIMETHOPRIM 800; 160 MG/1; MG/1
1 TABLET ORAL 2 TIMES DAILY
Qty: 14 TABLET | Refills: 0 | Status: SHIPPED | OUTPATIENT
Start: 2025-04-04 | End: 2025-04-11

## 2025-04-04 RX ORDER — FAMOTIDINE 20 MG/1
TABLET, FILM COATED ORAL
COMMUNITY

## 2025-04-04 RX ORDER — DEXTROAMPHETAMINE SACCHARATE, AMPHETAMINE ASPARTATE MONOHYDRATE, DEXTROAMPHETAMINE SULFATE AND AMPHETAMINE SULFATE 5; 5; 5; 5 MG/1; MG/1; MG/1; MG/1
20 CAPSULE, EXTENDED RELEASE ORAL
COMMUNITY
Start: 2025-03-13

## 2025-04-04 ASSESSMENT — ENCOUNTER SYMPTOMS
PALPITATIONS: 0
NUMBNESS: 0
WEAKNESS: 0
VOMITING: 0
WOUND: 1
ABDOMINAL PAIN: 0
DIARRHEA: 0
LIGHT-HEADEDNESS: 0
FATIGUE: 0
FEVER: 0
CHILLS: 0
ARTHRALGIAS: 0
JOINT SWELLING: 0
NAUSEA: 0
DIZZINESS: 0
COLOR CHANGE: 0

## 2025-04-05 NOTE — ED ATTESTATION NOTE
I was immediately available to provide supervision and direction for the care of the patient.    The patient was evaluated and managed by the nurse practitioner.       Nicolas Powell DO  04/05/25 0980

## 2025-04-05 NOTE — ED PROVIDER NOTES
Emergency Medicine Note  HPI   HISTORY OF PRESENT ILLNESS     19 y.o. female presents for tenderness, swelling, and drainage from pierced navel. States she first noticed the drainage about 3 days ago an the pain started today.  States she has been having the chills but this is normal for her due to history of POTS. She has not been febrile.   She has not had any infections to the piercing in the past. It has been in place for about 2 years.           Patient History   PAST HISTORY     Reviewed from Nursing Triage:       Past Medical History:   Diagnosis Date    Kidney infection     Pneumonia     Pott's disease        Past Surgical History   Procedure Laterality Date    Hernia repair         No family history on file.    Social History     Tobacco Use    Smoking status: Never    Smokeless tobacco: Never   Vaping Use    Vaping status: Never Used   Substance Use Topics    Alcohol use: Never    Drug use: Never         Review of Systems   REVIEW OF SYSTEMS     Review of Systems   Constitutional:  Negative for chills, fatigue and fever.   Cardiovascular:  Negative for chest pain and palpitations.   Gastrointestinal:  Negative for abdominal pain, diarrhea, nausea and vomiting.   Musculoskeletal:  Negative for arthralgias and joint swelling.   Skin:  Positive for wound. Negative for color change and rash.   Neurological:  Negative for dizziness, weakness, light-headedness and numbness.         VITALS     ED Vitals      Date/Time Temp Pulse Resp BP SpO2 [facility]   04/04/25 2026 36.7 °C (98 °F) 93 -- 106/74 96 % SLG                         Physical Exam   PHYSICAL EXAM     Physical Exam  Vitals reviewed.   Constitutional:       Appearance: Normal appearance.   HENT:      Head: Normocephalic.      Nose: Nose normal.      Mouth/Throat:      Mouth: Mucous membranes are moist.   Cardiovascular:      Rate and Rhythm: Normal rate and regular rhythm.   Pulmonary:      Effort: Pulmonary effort is normal.   Skin:     General: Skin is warm  and dry.      Capillary Refill: Capillary refill takes less than 2 seconds.      Comments: Papule noted to navel piercing, no active drainage, redness, swelling, mild TTP.    Neurological:      Mental Status: She is alert and oriented to person, place, and time.   Psychiatric:         Behavior: Behavior normal.           PROCEDURES     Procedures     DATA     Results       None                No orders to display       Scoring tools                                  ED Course & MDM   MDM / ED COURSE / CLINICAL IMPRESSION / DISPO     Medical Decision Making  Start bactrim for navel piercing tenderness with drainage  No active drainage currently, patient reported area drained previously today  Recommend removing piercing, use warm compresses and keep area covered  Seek re-evaluation for new or worsening symptoms   Patient verbalized understanding, in agreement with plan. All questions answered.           Clinical Impression      Pierced navel infection     _________________       ED Disposition   Discharge                       Sophia Moore CRNP  04/04/25 2055

## 2025-04-05 NOTE — DISCHARGE INSTRUCTIONS
Take the antibiotics as prescribed with food or a snack  Apply topical bacitracin ointment to the area    Recommended to remove the piercing. You can use a plastic spacer to keep the piercing from closing up  Seek re-evaluation for any concerns of worsening infection such as redness, swelling, worsening drainage

## 2025-04-16 DIAGNOSIS — F90.9 ATTENTION DEFICIT HYPERACTIVITY DISORDER (ADHD), UNSPECIFIED ADHD TYPE: ICD-10-CM

## 2025-04-16 RX ORDER — DEXTROAMPHETAMINE SACCHARATE, AMPHETAMINE ASPARTATE MONOHYDRATE, DEXTROAMPHETAMINE SULFATE AND AMPHETAMINE SULFATE 5; 5; 5; 5 MG/1; MG/1; MG/1; MG/1
20 CAPSULE, EXTENDED RELEASE ORAL EVERY MORNING
Qty: 30 CAPSULE | Refills: 0 | Status: SHIPPED | OUTPATIENT
Start: 2025-04-16

## 2025-04-16 NOTE — TELEPHONE ENCOUNTER
Reason for call:   [x] Refill   [] Prior Auth  [] Other:     Office:   [x] PCP/Provider - [facility]   [] Specialty/Provider -     Medication:     amphetamine-dextroamphetamine (ADDERALL XR) 20 MG 24 hr capsule  Take 1 capsule (20 mg total) by mouth every morning          Pharmacy: [facility]/pharmacy #[id number] - [place], [place]     Local Pharmacy   Does the patient have enough for 3 days?   [] Yes   [x] No - Send as HP to POD    Mail Away Pharmacy   Does the patient have enough for 10 days?   [] Yes   [] No - Send as HP to POD

## 2025-05-01 ENCOUNTER — HOSPITAL ENCOUNTER (OUTPATIENT)
Facility: CLINIC | Age: 20
Discharge: HOME | End: 2025-05-01
Attending: EMERGENCY MEDICINE
Payer: COMMERCIAL

## 2025-05-01 VITALS — DIASTOLIC BLOOD PRESSURE: 72 MMHG | SYSTOLIC BLOOD PRESSURE: 102 MMHG | TEMPERATURE: 97.7 F

## 2025-05-01 DIAGNOSIS — L08.9 SKIN PUSTULE: Primary | ICD-10-CM

## 2025-05-01 PROCEDURE — S9083 URGENT CARE CENTER GLOBAL: HCPCS | Performed by: NURSE PRACTITIONER

## 2025-05-01 PROCEDURE — 99213 OFFICE O/P EST LOW 20 MIN: CPT | Performed by: NURSE PRACTITIONER

## 2025-05-01 RX ORDER — MUPIROCIN 20 MG/G
OINTMENT TOPICAL
Qty: 22 G | Refills: 0 | Status: SHIPPED | OUTPATIENT
Start: 2025-05-01 | End: 2025-05-08

## 2025-05-14 DIAGNOSIS — F90.9 ATTENTION DEFICIT HYPERACTIVITY DISORDER (ADHD), UNSPECIFIED ADHD TYPE: ICD-10-CM

## 2025-05-14 RX ORDER — DEXTROAMPHETAMINE SACCHARATE, AMPHETAMINE ASPARTATE MONOHYDRATE, DEXTROAMPHETAMINE SULFATE AND AMPHETAMINE SULFATE 5; 5; 5; 5 MG/1; MG/1; MG/1; MG/1
20 CAPSULE, EXTENDED RELEASE ORAL EVERY MORNING
Qty: 90 CAPSULE | Refills: 0 | Status: SHIPPED | OUTPATIENT
Start: 2025-05-14 | End: 2025-05-16 | Stop reason: SDUPTHER

## 2025-05-15 DIAGNOSIS — F90.9 ATTENTION DEFICIT HYPERACTIVITY DISORDER (ADHD), UNSPECIFIED ADHD TYPE: ICD-10-CM

## 2025-05-15 RX ORDER — DEXTROAMPHETAMINE SACCHARATE, AMPHETAMINE ASPARTATE MONOHYDRATE, DEXTROAMPHETAMINE SULFATE AND AMPHETAMINE SULFATE 5; 5; 5; 5 MG/1; MG/1; MG/1; MG/1
20 CAPSULE, EXTENDED RELEASE ORAL EVERY MORNING
Qty: 90 CAPSULE | Refills: 0 | OUTPATIENT
Start: 2025-05-15

## 2025-05-16 DIAGNOSIS — F90.9 ATTENTION DEFICIT HYPERACTIVITY DISORDER (ADHD), UNSPECIFIED ADHD TYPE: ICD-10-CM

## 2025-05-16 RX ORDER — DEXTROAMPHETAMINE SACCHARATE, AMPHETAMINE ASPARTATE MONOHYDRATE, DEXTROAMPHETAMINE SULFATE AND AMPHETAMINE SULFATE 5; 5; 5; 5 MG/1; MG/1; MG/1; MG/1
20 CAPSULE, EXTENDED RELEASE ORAL EVERY MORNING
Qty: 90 CAPSULE | Refills: 0 | Status: SHIPPED | OUTPATIENT
Start: 2025-05-16

## 2025-05-16 NOTE — TELEPHONE ENCOUNTER
Patient stated that [facility] is out of stock of the Adderral. Requested medication to be sent to [facility] on [facility]. Medication pended and awaiting signature.    FIDELIA Sanchez

## 2025-06-13 DIAGNOSIS — F90.9 ATTENTION DEFICIT HYPERACTIVITY DISORDER (ADHD), UNSPECIFIED ADHD TYPE: ICD-10-CM

## 2025-06-13 RX ORDER — DEXTROAMPHETAMINE SACCHARATE, AMPHETAMINE ASPARTATE MONOHYDRATE, DEXTROAMPHETAMINE SULFATE AND AMPHETAMINE SULFATE 5; 5; 5; 5 MG/1; MG/1; MG/1; MG/1
20 CAPSULE, EXTENDED RELEASE ORAL EVERY MORNING
Qty: 30 CAPSULE | Refills: 0 | Status: SHIPPED | OUTPATIENT
Start: 2025-06-13

## 2025-07-15 DIAGNOSIS — F90.9 ATTENTION DEFICIT HYPERACTIVITY DISORDER (ADHD), UNSPECIFIED ADHD TYPE: ICD-10-CM

## 2025-07-16 DIAGNOSIS — F90.9 ATTENTION DEFICIT HYPERACTIVITY DISORDER (ADHD), UNSPECIFIED ADHD TYPE: ICD-10-CM

## 2025-07-16 RX ORDER — DEXTROAMPHETAMINE SACCHARATE, AMPHETAMINE ASPARTATE MONOHYDRATE, DEXTROAMPHETAMINE SULFATE AND AMPHETAMINE SULFATE 5; 5; 5; 5 MG/1; MG/1; MG/1; MG/1
20 CAPSULE, EXTENDED RELEASE ORAL EVERY MORNING
Qty: 90 CAPSULE | Refills: 0 | Status: SHIPPED | OUTPATIENT
Start: 2025-07-16

## 2025-07-16 RX ORDER — DEXTROAMPHETAMINE SACCHARATE, AMPHETAMINE ASPARTATE MONOHYDRATE, DEXTROAMPHETAMINE SULFATE AND AMPHETAMINE SULFATE 5; 5; 5; 5 MG/1; MG/1; MG/1; MG/1
20 CAPSULE, EXTENDED RELEASE ORAL EVERY MORNING
Qty: 30 CAPSULE | Refills: 0 | Status: SHIPPED | OUTPATIENT
Start: 2025-07-16 | End: 2025-07-16 | Stop reason: SDUPTHER

## 2025-08-06 ENCOUNTER — TELEPHONE (OUTPATIENT)
Dept: FAMILY MEDICINE CLINIC | Facility: CLINIC | Age: 20
End: 2025-08-06

## 2025-08-06 ENCOUNTER — OFFICE VISIT (OUTPATIENT)
Dept: FAMILY MEDICINE CLINIC | Facility: CLINIC | Age: 20
End: 2025-08-06

## 2025-08-06 VITALS
WEIGHT: 137 LBS | BODY MASS INDEX: 20.83 KG/M2 | HEART RATE: 88 BPM | OXYGEN SATURATION: 98 % | TEMPERATURE: 97.5 F | RESPIRATION RATE: 16 BRPM | DIASTOLIC BLOOD PRESSURE: 64 MMHG | SYSTOLIC BLOOD PRESSURE: 96 MMHG

## 2025-08-06 DIAGNOSIS — J02.9 PHARYNGITIS, UNSPECIFIED ETIOLOGY: Primary | ICD-10-CM

## 2025-08-06 DIAGNOSIS — R63.4 WEIGHT LOSS: ICD-10-CM

## 2025-08-06 DIAGNOSIS — R10.9 ABDOMINAL PAIN, UNSPECIFIED ABDOMINAL LOCATION: ICD-10-CM

## 2025-08-06 DIAGNOSIS — R50.9 FEVER, UNSPECIFIED FEVER CAUSE: ICD-10-CM

## 2025-08-06 DIAGNOSIS — J45.990 EXERCISE-INDUCED ASTHMA: ICD-10-CM

## 2025-08-06 DIAGNOSIS — B99.9 RECURRENT INFECTIONS: ICD-10-CM

## 2025-08-06 DIAGNOSIS — M79.10 MUSCLE PAIN: ICD-10-CM

## 2025-08-06 PROCEDURE — 99214 OFFICE O/P EST MOD 30 MIN: CPT | Performed by: STUDENT IN AN ORGANIZED HEALTH CARE EDUCATION/TRAINING PROGRAM

## 2025-08-06 RX ORDER — DOXYCYCLINE 100 MG/1
CAPSULE ORAL
COMMUNITY
Start: 2025-08-05

## 2025-08-07 ENCOUNTER — TELEPHONE (OUTPATIENT)
Dept: FAMILY MEDICINE CLINIC | Facility: CLINIC | Age: 20
End: 2025-08-07

## 2025-08-07 DIAGNOSIS — J02.9 PHARYNGITIS, UNSPECIFIED ETIOLOGY: Primary | ICD-10-CM

## 2025-08-07 DIAGNOSIS — M79.10 MUSCLE PAIN: ICD-10-CM

## 2025-08-07 DIAGNOSIS — B99.9 RECURRENT INFECTIONS: ICD-10-CM

## 2025-08-07 DIAGNOSIS — J45.990 EXERCISE-INDUCED ASTHMA: ICD-10-CM

## 2025-08-07 DIAGNOSIS — R10.9 ABDOMINAL PAIN, UNSPECIFIED ABDOMINAL LOCATION: ICD-10-CM

## 2025-08-07 DIAGNOSIS — R50.9 FEVER, UNSPECIFIED FEVER CAUSE: ICD-10-CM

## 2025-08-16 LAB
ACTH PLAS-MCNC: 8 PG/ML (ref 6–50)
ALBUMIN SERPL ELPH-MCNC: 3.5 G/DL (ref 3.8–4.8)
ALPHA1 GLOB SERPL ELPH-MCNC: 0.5 G/DL (ref 0.2–0.3)
ALPHA2 GLOB SERPL ELPH-MCNC: 0.8 G/DL (ref 0.5–0.9)
B2 MICROGLOB SERPL-MCNC: 1.9 MG/L
BASOPHILS # BLD AUTO: 104 CELLS/UL (ref 0–200)
BASOPHILS NFR BLD AUTO: 1.3 %
BETA1 GLOB SERPL ELPH-MCNC: 0.6 G/DL (ref 0.4–0.6)
BETA2 GLOB SERPL ELPH-MCNC: 0.4 G/DL (ref 0.2–0.5)
C3 SERPL-MCNC: 164 MG/DL (ref 83–193)
C4 SERPL-MCNC: 22 MG/DL (ref 15–57)
CORTIS SERPL-MCNC: 31 MCG/DL
CRP SERPL-MCNC: <3 MG/L
EOSINOPHIL # BLD AUTO: 752 CELLS/UL (ref 15–500)
EOSINOPHIL NFR BLD AUTO: 9.4 %
ERYTHROCYTE [DISTWIDTH] IN BLOOD BY AUTOMATED COUNT: 12.4 % (ref 11–15)
ESTROGEN SERPL-MCNC: 104 PG/ML
FERRITIN SERPL-MCNC: 31 NG/ML (ref 16–154)
FSH SERPL-ACNC: <0.7 MIU/ML
GAMMA GLOB SERPL ELPH-MCNC: 0.8 G/DL (ref 0.8–1.7)
HCT VFR BLD AUTO: 39.2 % (ref 35–45)
HGB BLD-MCNC: 12.6 G/DL (ref 11.7–15.5)
IRON SATN MFR SERPL: 23 % (CALC) (ref 16–45)
IRON SERPL-MCNC: 104 MCG/DL (ref 40–190)
LDH SERPL-CCNC: 124 U/L (ref 100–200)
LH SERPL-ACNC: 0.2 MIU/ML
LYMPHOCYTES # BLD AUTO: 2128 CELLS/UL (ref 850–3900)
LYMPHOCYTES NFR BLD AUTO: 26.6 %
MCH RBC QN AUTO: 30 PG (ref 27–33)
MCHC RBC AUTO-ENTMCNC: 32.1 G/DL (ref 32–36)
MCV RBC AUTO: 93.3 FL (ref 80–100)
MONOCYTES # BLD AUTO: 624 CELLS/UL (ref 200–950)
MONOCYTES NFR BLD AUTO: 7.8 %
NEUTROPHILS # BLD AUTO: 4392 CELLS/UL (ref 1500–7800)
NEUTROPHILS NFR BLD AUTO: 54.9 %
PLATELET # BLD AUTO: 332 THOUSAND/UL (ref 140–400)
PMV BLD REES-ECKER: 9.8 FL (ref 7.5–12.5)
PROGEST SERPL-MCNC: 0.5 NG/ML
PROLACTIN SERPL-MCNC: 13.3 NG/ML
PROT PATTERN SERPL ELPH-IMP: ABNORMAL
PROT SERPL-MCNC: 6.5 G/DL (ref 6.1–8.1)
RBC # BLD AUTO: 4.2 MILLION/UL (ref 3.8–5.1)
RHEUMATOID FACT SERPL-ACNC: <10 IU/ML
T3FREE SERPL-MCNC: 3.8 PG/ML (ref 2.3–4.2)
TIBC SERPL-MCNC: 454 MCG/DL (CALC) (ref 250–450)
TSH SERPL-ACNC: 3.15 MIU/L
WBC # BLD AUTO: 8 THOUSAND/UL (ref 3.8–10.8)

## 2025-08-18 LAB
ACTH PLAS-MCNC: 8 PG/ML (ref 6–50)
ALBUMIN SERPL ELPH-MCNC: 3.5 G/DL (ref 3.8–4.8)
ALPHA1 GLOB SERPL ELPH-MCNC: 0.5 G/DL (ref 0.2–0.3)
ALPHA2 GLOB SERPL ELPH-MCNC: 0.8 G/DL (ref 0.5–0.9)
ANA SER QL IF: NEGATIVE
B2 MICROGLOB SERPL-MCNC: 1.9 MG/L
BASOPHILS # BLD AUTO: 104 CELLS/UL (ref 0–200)
BASOPHILS NFR BLD AUTO: 1.3 %
BETA1 GLOB SERPL ELPH-MCNC: 0.6 G/DL (ref 0.4–0.6)
BETA2 GLOB SERPL ELPH-MCNC: 0.4 G/DL (ref 0.2–0.5)
C3 SERPL-MCNC: 164 MG/DL (ref 83–193)
C4 SERPL-MCNC: 22 MG/DL (ref 15–57)
CORTIS SERPL-MCNC: 31 MCG/DL
CRP SERPL-MCNC: <3 MG/L
EOSINOPHIL # BLD AUTO: 752 CELLS/UL (ref 15–500)
EOSINOPHIL NFR BLD AUTO: 9.4 %
ERYTHROCYTE [DISTWIDTH] IN BLOOD BY AUTOMATED COUNT: 12.4 % (ref 11–15)
ESTROGEN SERPL-MCNC: 104 PG/ML
FERRITIN SERPL-MCNC: 31 NG/ML (ref 16–154)
FSH SERPL-ACNC: <0.7 MIU/ML
GAMMA GLOB SERPL ELPH-MCNC: 0.8 G/DL (ref 0.8–1.7)
HCT VFR BLD AUTO: 39.2 % (ref 35–45)
HGB BLD-MCNC: 12.6 G/DL (ref 11.7–15.5)
IRON SATN MFR SERPL: 23 % (CALC) (ref 16–45)
IRON SERPL-MCNC: 104 MCG/DL (ref 40–190)
LDH SERPL-CCNC: 124 U/L (ref 100–200)
LH SERPL-ACNC: 0.2 MIU/ML
LYMPHOCYTES # BLD AUTO: 2128 CELLS/UL (ref 850–3900)
LYMPHOCYTES NFR BLD AUTO: 26.6 %
MCH RBC QN AUTO: 30 PG (ref 27–33)
MCHC RBC AUTO-ENTMCNC: 32.1 G/DL (ref 32–36)
MCV RBC AUTO: 93.3 FL (ref 80–100)
MONOCYTES # BLD AUTO: 624 CELLS/UL (ref 200–950)
MONOCYTES NFR BLD AUTO: 7.8 %
NEUTROPHILS # BLD AUTO: 4392 CELLS/UL (ref 1500–7800)
NEUTROPHILS NFR BLD AUTO: 54.9 %
PLATELET # BLD AUTO: 332 THOUSAND/UL (ref 140–400)
PMV BLD REES-ECKER: 9.8 FL (ref 7.5–12.5)
PROGEST SERPL-MCNC: 0.5 NG/ML
PROLACTIN SERPL-MCNC: 13.3 NG/ML
PROT PATTERN SERPL ELPH-IMP: ABNORMAL
PROT SERPL-MCNC: 6.5 G/DL (ref 6.1–8.1)
RBC # BLD AUTO: 4.2 MILLION/UL (ref 3.8–5.1)
RHEUMATOID FACT SERPL-ACNC: <10 IU/ML
T3FREE SERPL-MCNC: 3.8 PG/ML (ref 2.3–4.2)
TIBC SERPL-MCNC: 454 MCG/DL (CALC) (ref 250–450)
TSH SERPL-ACNC: 3.15 MIU/L
WBC # BLD AUTO: 8 THOUSAND/UL (ref 3.8–10.8)

## 2025-08-21 DIAGNOSIS — R59.1 LYMPHADENOPATHY OF HEAD AND NECK: ICD-10-CM

## 2025-08-21 DIAGNOSIS — R63.4 WEIGHT LOSS: ICD-10-CM

## 2025-08-21 DIAGNOSIS — R10.9 ABDOMINAL PAIN, UNSPECIFIED ABDOMINAL LOCATION: Primary | ICD-10-CM
